# Patient Record
Sex: FEMALE | Race: WHITE | NOT HISPANIC OR LATINO | Employment: FULL TIME | URBAN - METROPOLITAN AREA
[De-identification: names, ages, dates, MRNs, and addresses within clinical notes are randomized per-mention and may not be internally consistent; named-entity substitution may affect disease eponyms.]

---

## 2020-09-16 ENCOUNTER — LAB (OUTPATIENT)
Dept: LAB | Facility: HOSPITAL | Age: 20
End: 2020-09-16

## 2020-09-16 ENCOUNTER — TRANSCRIBE ORDERS (OUTPATIENT)
Dept: LAB | Facility: HOSPITAL | Age: 20
End: 2020-09-16

## 2020-09-16 DIAGNOSIS — R73.01 IMPAIRED FASTING GLUCOSE: Primary | ICD-10-CM

## 2020-09-16 PROCEDURE — 36415 COLL VENOUS BLD VENIPUNCTURE: CPT | Performed by: NURSE PRACTITIONER

## 2020-09-16 PROCEDURE — 83036 HEMOGLOBIN GLYCOSYLATED A1C: CPT | Performed by: NURSE PRACTITIONER

## 2020-09-16 PROCEDURE — 82947 ASSAY GLUCOSE BLOOD QUANT: CPT | Performed by: NURSE PRACTITIONER

## 2020-09-17 LAB
GLUCOSE SERPL-MCNC: 92 MG/DL (ref 65–99)
HBA1C MFR BLD: 5.36 % (ref 4.8–5.6)

## 2021-12-20 ENCOUNTER — OFFICE VISIT (OUTPATIENT)
Dept: NEUROLOGY | Facility: CLINIC | Age: 21
End: 2021-12-20

## 2021-12-20 VITALS
SYSTOLIC BLOOD PRESSURE: 102 MMHG | WEIGHT: 143 LBS | RESPIRATION RATE: 14 BRPM | TEMPERATURE: 98.6 F | BODY MASS INDEX: 24.41 KG/M2 | HEIGHT: 64 IN | OXYGEN SATURATION: 99 % | HEART RATE: 94 BPM | DIASTOLIC BLOOD PRESSURE: 68 MMHG

## 2021-12-20 DIAGNOSIS — G43.709 CHRONIC MIGRAINE WITHOUT AURA WITHOUT STATUS MIGRAINOSUS, NOT INTRACTABLE: Primary | ICD-10-CM

## 2021-12-20 PROCEDURE — 99214 OFFICE O/P EST MOD 30 MIN: CPT | Performed by: NURSE PRACTITIONER

## 2021-12-20 RX ORDER — LEVOCETIRIZINE DIHYDROCHLORIDE 5 MG/1
5 TABLET, FILM COATED ORAL EVERY EVENING
COMMUNITY
End: 2023-03-31

## 2021-12-20 RX ORDER — DROSPIRENONE AND ETHINYL ESTRADIOL 0.03MG-3MG
1 KIT ORAL DAILY
COMMUNITY
End: 2023-03-31

## 2021-12-20 RX ORDER — SUMATRIPTAN 50 MG/1
TABLET, FILM COATED ORAL
Qty: 9 TABLET | Refills: 5 | Status: SHIPPED | OUTPATIENT
Start: 2021-12-20 | End: 2022-01-05

## 2021-12-20 RX ORDER — PROPRANOLOL HYDROCHLORIDE 10 MG/1
10 TABLET ORAL AS NEEDED
COMMUNITY
End: 2023-03-31

## 2021-12-20 RX ORDER — AZELASTINE HYDROCHLORIDE 0.5 MG/ML
1 SOLUTION/ DROPS OPHTHALMIC DAILY
COMMUNITY
End: 2023-03-31

## 2021-12-20 RX ORDER — BUSPIRONE HYDROCHLORIDE 15 MG/1
15 TABLET ORAL 2 TIMES DAILY
COMMUNITY

## 2021-12-20 RX ORDER — VIT C/B6/B5/MAGNESIUM/HERB 173 50-5-6-5MG
1000 CAPSULE ORAL DAILY
COMMUNITY
End: 2023-03-31

## 2021-12-20 NOTE — PROGRESS NOTES
Subjective:     Patient ID: Delmis Lynne is a 21 y.o. female.    CC:   Chief Complaint   Patient presents with   • Establish Care     migraines       HPI:   History of Present Illness     Delmis is a very pleasant 21-year-old female here today for initial neurological evaluation for migraine headaches.  She tells me she first started to notice migraines about a year and a half ago.  When she first began having headache she was experiencing a scotoma and some blurred vision that would lead to headaches.  She first saw her eye doctor who told her that she was likely having migraines and recommended that she follow-up with her primary care.  She does suffer from anxiety and sees a mental health provider a couple times a year, she takes BuSpar and as needed propranolol, she only takes propranolol maybe once per month.  She discussed her headaches with her mental health nurse practitioner who told her she might want to talk to her OB/GYN about this, no one has ever really given her any medications for migraines.  She is never taken any triptan's.  When she gets migraines are typically on the right side of her head described as a pressure, sometimes they wake her up at night, sometimes they occur throughout the day.  She does have associated photo and phonophobia as well as nausea and at times diarrhea with her migraines.  When she has a headache she will drink caffeine and take Excedrin at times, nothing really seems to be effective.  She does not really drink caffeine regularly otherwise.  She drinks a lot of water, she gets adequate sleep.  She is having at least 2 migraines per week, typically they last up to 8 hours, she has to go lie down and sometimes they will resolve.  She has never had any imaging of her brain.  She has had a normal eye exam, she was given glasses but was told everything with her discs and nerves looked good  She is on birth control, no plans for pregnancy in the near future  She denies any  "vision changes, confusional spells, seizures, stroke symptoms or slurred speech.  She denies paresthesias, syncope, tremor, weakness or falls.  No significant family history of migraines, denies history of head injury    The following portions of the patient's history were reviewed and updated as appropriate: allergies, current medications, past family history, past medical history, past social history, past surgical history and problem list.    Past Medical History:   Diagnosis Date   • Anxiety    • Environmental allergies        Past Surgical History:   Procedure Laterality Date   • TONSILLECTOMY  2004       Social History     Socioeconomic History   • Marital status:    Tobacco Use   • Smoking status: Never Smoker   • Smokeless tobacco: Never Used   Vaping Use   • Vaping Use: Never used   Substance and Sexual Activity   • Alcohol use: Never       Family History   Problem Relation Age of Onset   • Alcohol abuse Father    • Breast cancer Maternal Grandmother         Review of Systems   Constitutional: Negative.    Eyes: Negative.    Respiratory: Negative.    Cardiovascular: Negative.    Gastrointestinal: Negative.    Genitourinary: Negative.    Musculoskeletal: Negative.    Neurological: Positive for headaches. Negative for dizziness, tremors, seizures, syncope, facial asymmetry, speech difficulty, weakness, light-headedness and numbness.   Psychiatric/Behavioral: The patient is nervous/anxious.         Objective:  /68   Pulse 94   Temp 98.6 °F (37 °C) (Infrared)   Resp 14   Ht 162.6 cm (64\")   Wt 64.9 kg (143 lb)   SpO2 99%   BMI 24.55 kg/m²     Neurologic Exam     Mental Status   Oriented to person, place, and time.   Attention: normal. Concentration: normal.   Speech: speech is normal   Level of consciousness: alert  Knowledge: consistent with education.   Able to read. Able to write. Normal comprehension.     Cranial Nerves   Cranial nerves II through XII intact.     CN II   Visual fields " full to confrontation.   Right visual field deficit: none  Left visual field deficit: none     CN III, IV, VI   Pupils are equal, round, and reactive to light.  Extraocular motions are normal.   Right pupil: Size: 3 mm. Shape: regular. Reactivity: brisk. Consensual response: intact. Accommodation: intact.   Left pupil: Size: 3 mm. Shape: regular. Reactivity: brisk. Consensual response: intact. Accommodation: intact.   CN III: no CN III palsy  CN VI: no CN VI palsy  Nystagmus: none   Upgaze: normal  Downgaze: normal    CN V   Facial sensation intact.     CN VII   Facial expression full, symmetric.     CN VIII   CN VIII normal.     CN IX, X   CN IX normal.   CN X normal.     CN XI   CN XI normal.     CN XII   CN XII normal.     Motor Exam   Muscle bulk: normal  Overall muscle tone: normal  Right arm tone: normal  Left arm tone: normal  Right arm pronator drift: absent  Left arm pronator drift: absent  Right leg tone: normal  Left leg tone: normal    Strength   Strength 5/5 throughout.     Sensory Exam   Light touch normal.     Gait, Coordination, and Reflexes     Gait  Gait: normal    Coordination   Romberg: negative  Finger to nose coordination: normal  Heel to shin coordination: normal  Tandem walking coordination: normal    Tremor   Resting tremor: absent  Intention tremor: absent  Action tremor: absent    Reflexes   Reflexes 2+ except as noted.   Right plantar: normal  Left plantar: normal  Right Mann: absent  Left Mann: absent      Physical Exam  Constitutional:       General: She is not in acute distress.     Appearance: Normal appearance. She is not ill-appearing or toxic-appearing.   HENT:      Mouth/Throat:      Mouth: Mucous membranes are moist.   Eyes:      Extraocular Movements: Extraocular movements intact and EOM normal.      Pupils: Pupils are equal, round, and reactive to light.   Pulmonary:      Effort: Pulmonary effort is normal.   Neurological:      General: No focal deficit present.       Mental Status: She is alert and oriented to person, place, and time.      Coordination: Finger-Nose-Finger Test, Heel to Shin Test and Romberg Test normal.      Gait: Gait is intact. Tandem walk normal.      Deep Tendon Reflexes: Strength normal.   Psychiatric:         Mood and Affect: Mood normal.         Speech: Speech normal.         Assessment/Plan:       Diagnoses and all orders for this visit:    1. Chronic migraine without aura without status migrainosus, not intractable (Primary)  -     MRI Brain Without Contrast; Future    Other orders  -     SUMAtriptan (IMITREX) 50 MG tablet; Take 1/2 to 1 tablet at onset of headache. May repeat dose one time in 2 hours if headache not relieved.  Dispense: 9 tablet; Refill: 5    This patient has a history of migraines for about a year and a half now.  She has never had any brain imaging, will check MRI of the brain without contrast rule out tumor or lesion.  She is not really interested in a daily preventative medication at this time.  She does currently take propranolol as needed 10 mg, she uses this rarely.  She has a blood pressure today 102/68 so will be hesitant for her to use this regularly.  We did discuss possibly trying amitriptyline should she decide she would like to use something for prevention.  I have also given her a handout on supplements including magnesium, co-Q10 and riboflavin that she can obtain over-the-counter.  Recommend that she drink plenty of fluids, limit caffeine and NSAIDs.  She sleeps well, sleep apnea unlikely given body habitus but could be a consideration if headaches continue in spite of treatment  She is up-to-date on eye exam  I have given her prescription for sumatriptan to use as needed, she is instructed on proper use and possible side effects.  I would like to see her back here at next available clinic appointment, she is encouraged to reach out to us with any questions concerns or problems prior to follow-up appointment            Reviewed medications, potential side effects and signs and symptoms to report. Discussed risk versus benefits of treatment plan with patient and/or family-including medications, labs and radiology that may be ordered. Addressed questions and concerns during visit. Patient and/or family verbalized understanding and agree with plan.    AS THE PROVIDER, I PERSONALLY WORE PPE DURING ENTIRE FACE TO FACE ENCOUNTER IN CLINIC WITH THE PATIENT. PATIENT ALSO WORE PPE DURING ENTIRE FACE TO FACE ENCOUNTER EXCEPT FOR A MAX OF 30 SECONDS DURING NEUROLOGICAL EVALUATION OF CRANIAL NERVES AND THEN MASK WAS PLACED BACK OVER PATIENT FACE FOR REMAINDER OF VISIT. I WASHED MY HANDS BEFORE AND AFTER VISIT.    During this visit the following were done:  Labs Reviewed []    Labs Ordered []    Radiology Reports Reviewed []    Radiology Ordered []    PCP Records Reviewed []    Referring Provider Records Reviewed []    ER Records Reviewed []    Hospital Records Reviewed []    History Obtained From Family []    Radiology Images Reviewed []    Other Reviewed []    Records Requested []      Marcelina Humphries, APRN  12/20/2021

## 2022-01-04 ENCOUNTER — TELEPHONE (OUTPATIENT)
Dept: NEUROLOGY | Facility: CLINIC | Age: 22
End: 2022-01-04

## 2022-01-04 DIAGNOSIS — G43.709 CHRONIC MIGRAINE WITHOUT AURA WITHOUT STATUS MIGRAINOSUS, NOT INTRACTABLE: Primary | ICD-10-CM

## 2022-01-04 NOTE — TELEPHONE ENCOUNTER
Caller: Delmis Lynne    Relationship: Self    Best call back number: [phone]    What medications are you currently taking:   Current Outpatient Medications on File Prior to Visit   Medication Sig Dispense Refill   • azelastine (OPTIVAR) 0.05 % ophthalmic solution 1 drop Daily.     • busPIRone (BUSPAR) 15 MG tablet Take 15 mg by mouth 2 (Two) Times a Day.     • drospirenone-ethinyl estradiol (Ocella) 3-0.03 MG per tablet Take 1 tablet by mouth Daily.     • levocetirizine (XYZAL) 5 MG tablet Take 5 mg by mouth Every Evening.     • Probiotic Product (PROBIOTIC-10 PO) Take  by mouth Daily.     • propranolol (INDERAL) 10 MG tablet Take 10 mg by mouth As Needed.     • SUMAtriptan (IMITREX) 50 MG tablet Take 1/2 to 1 tablet at onset of headache. May repeat dose one time in 2 hours if headache not relieved. 9 tablet 5   • Turmeric 500 MG capsule Take 1,000 mg by mouth Daily.     • Zinc Oxide-Vitamin C (ZINC PLUS VITAMIN C PO) Take 1 tablet by mouth Daily.       No current facility-administered medications on file prior to visit.      When did you start taking these medications: 12-31-21    Which medication are you concerned about: SUMAtriptan (IMITREX) 50 MG tablet    Who prescribed you this medication:   SACHI    What are your concerns: PT HAS TAKEN THE RX TWICE. THE FIRST TIME SHE TOOK IT, SHE SAYS IT DIDN'T WORK.    THE SECOND TIME SHE TOOK IT ON 1-1-22 THE PAIN LEFT FOR ABOUT 2 HOURS AND THEN THE HEADACHE RETURNED.    PT IS WANTING TO KNOW IF SHE CAN GET A STRONGER DOSAGE.    PT'S PHARM IS [facility] AS LISTED.

## 2022-01-05 ENCOUNTER — APPOINTMENT (OUTPATIENT)
Dept: MRI IMAGING | Facility: HOSPITAL | Age: 22
End: 2022-01-05

## 2022-01-05 RX ORDER — SUMATRIPTAN 100 MG/1
TABLET, FILM COATED ORAL
Qty: 9 TABLET | Refills: 5 | Status: SHIPPED | OUTPATIENT
Start: 2022-01-05 | End: 2022-03-11

## 2022-01-05 NOTE — TELEPHONE ENCOUNTER
Patient informed of message and she actually did get a notice from her Pharmacy and she has picked up the new RX.

## 2022-01-05 NOTE — TELEPHONE ENCOUNTER
Yes, we can increase the sumatriptan to 100mg. She can take 1 pill at onset of migraine and she can repeat taking 1 pill 2 hours later if migraine occurs. Will send new script to pharmacy. ThanksSunshine

## 2022-01-06 ENCOUNTER — APPOINTMENT (OUTPATIENT)
Dept: MRI IMAGING | Facility: HOSPITAL | Age: 22
End: 2022-01-06

## 2022-03-11 ENCOUNTER — OFFICE VISIT (OUTPATIENT)
Dept: NEUROLOGY | Facility: CLINIC | Age: 22
End: 2022-03-11

## 2022-03-11 VITALS
TEMPERATURE: 97.2 F | BODY MASS INDEX: 24.41 KG/M2 | DIASTOLIC BLOOD PRESSURE: 70 MMHG | SYSTOLIC BLOOD PRESSURE: 120 MMHG | HEART RATE: 93 BPM | OXYGEN SATURATION: 97 % | WEIGHT: 143 LBS | HEIGHT: 64 IN

## 2022-03-11 DIAGNOSIS — G43.709 CHRONIC MIGRAINE WITHOUT AURA WITHOUT STATUS MIGRAINOSUS, NOT INTRACTABLE: Primary | ICD-10-CM

## 2022-03-11 PROCEDURE — 99213 OFFICE O/P EST LOW 20 MIN: CPT | Performed by: NURSE PRACTITIONER

## 2022-03-11 RX ORDER — RIZATRIPTAN BENZOATE 10 MG/1
10 TABLET, ORALLY DISINTEGRATING ORAL ONCE AS NEEDED
Qty: 9 TABLET | Refills: 5 | Status: SHIPPED | OUTPATIENT
Start: 2022-03-11 | End: 2023-03-31

## 2022-03-11 RX ORDER — AZELASTINE 1 MG/ML
SPRAY, METERED NASAL
COMMUNITY
End: 2023-03-31

## 2022-03-11 RX ORDER — ONDANSETRON 4 MG/1
TABLET, FILM COATED ORAL
COMMUNITY

## 2022-03-11 NOTE — PROGRESS NOTES
Subjective:     Patient ID: Delmis Lynne is a 21 y.o. female.    CC:   Chief Complaint   Patient presents with   • Migraine       HPI:   History of Present Illness     Delmis is here today for follow up.  I first saw her back in December 2021 to establish care for migraine headaches.      She told me she first started to notice migraines about a year and a half prior.  When she first began having headache she was experiencing a scotoma and some blurred vision that would lead to headaches.  She first saw her eye doctor who told her that she was likely having migraines and recommended that she follow-up with her primary care.  She does suffer from anxiety and sees a mental health provider a couple times a year, she takes BuSpar and as needed propranolol, she only takes propranolol maybe once per month.  She discussed her headaches with her mental health nurse practitioner who told her she might want to talk to her OB/GYN about this, no one has ever really given her any medications for migraines.      She had never taken any preventative or acute migraine medications.  When said that when she gets migraines they are typically on the right side of her head described as a pressure, sometimes they wake her up at night, sometimes they occur throughout the day.  She does have associated photo and phonophobia as well as nausea and at times diarrhea with her migraines.  When she has a headache she will drink caffeine and take Excedrin at times, nothing really seems to be effective.  She does not really drink caffeine regularly otherwise.  She drinks a lot of water, she gets adequate sleep.  She was having at least 2 migraines per week, typically they last up to 8 hours, she has to go lie down and sometimes they will resolve.  She had never had any imaging of her brain.  She has had a normal eye exam, she was given glasses but was told everything with her discs and nerves looked good  She is on birth control, no plans for  pregnancy in the near future  She denied any vision changes, confusional spells, seizures, stroke symptoms or slurred speech.  She denied paresthesias, syncope, tremor, weakness or falls.  No significant family history of migraines, denies history of head injury    After last visit we did discuss medication options, she was not interested in a daily medication at that time, I gave her a prescription for sumatriptan, she does not really feel like it is effective, she had increased from 50 mg as needed 200 mg, she never took a second dose.  She does feel like her headaches are a little less often maybe 1/week.  She has no new headache features.  I did order an MRI of her brain when she was here last, unfortunately due to some family issues and having COVID she has not yet had the scan.  At this time financially she does not feel like it is a good time so she does not want this reordered at this time.  She has no new complaints  The following portions of the patient's history were reviewed and updated as appropriate: allergies, current medications, past family history, past medical history, past social history, past surgical history and problem list.    Past Medical History:   Diagnosis Date   • Anxiety    • Environmental allergies        Past Surgical History:   Procedure Laterality Date   • TONSILLECTOMY  2004       Social History     Socioeconomic History   • Marital status:    Tobacco Use   • Smoking status: Never Smoker   • Smokeless tobacco: Never Used   Vaping Use   • Vaping Use: Never used   Substance and Sexual Activity   • Alcohol use: Never   • Drug use: Never   • Sexual activity: Yes     Partners: Male     Birth control/protection: OCP       Family History   Problem Relation Age of Onset   • Alcohol abuse Father    • Breast cancer Maternal Grandmother         Review of Systems   Constitutional: Negative.    Eyes: Negative.    Respiratory: Negative.    Cardiovascular: Negative.    Gastrointestinal:  "Negative.    Neurological: Positive for headaches. Negative for dizziness, tremors, seizures, syncope, facial asymmetry, speech difficulty, weakness, light-headedness and numbness.        Objective:  /70   Pulse 93   Temp 97.2 °F (36.2 °C)   Ht 162.6 cm (64\")   Wt 64.9 kg (143 lb)   SpO2 97%   BMI 24.55 kg/m²     Neurologic Exam     Mental Status   Oriented to person, place, and time.   Attention: normal. Concentration: normal.   Speech: speech is normal   Level of consciousness: alert  Knowledge: consistent with education.   Able to read. Able to write. Normal comprehension.     Cranial Nerves   Cranial nerves II through XII intact.     CN III, IV, VI   Pupils are equal, round, and reactive to light.  Extraocular motions are normal.   Right pupil: Shape: regular. Reactivity: brisk. Consensual response: intact. Accommodation: intact.   Left pupil: Shape: regular. Reactivity: brisk. Consensual response: intact. Accommodation: intact.   CN III: no CN III palsy  CN VI: no CN VI palsy  Nystagmus: none   Upgaze: normal  Downgaze: normal    CN V   Facial sensation intact.     CN VII   Facial expression full, symmetric.     CN VIII   CN VIII normal.     CN IX, X   CN IX normal.   CN X normal.     CN XI   CN XI normal.     CN XII   CN XII normal.     Motor Exam   Muscle bulk: normal  Overall muscle tone: normal  Right arm pronator drift: absent  Left arm pronator drift: absent    Strength   Strength 5/5 throughout.     Sensory Exam   Light touch normal.     Gait, Coordination, and Reflexes     Gait  Gait: normal    Coordination   Finger to nose coordination: normal    Tremor   Resting tremor: absent  Intention tremor: absent  Action tremor: absent    Reflexes   Reflexes 2+ except as noted.       Physical Exam  Vitals reviewed.   Constitutional:       General: She is not in acute distress.     Appearance: She is well-developed. She is not ill-appearing or toxic-appearing.   HENT:      Head: Normocephalic and " atraumatic.      Mouth/Throat:      Mouth: Mucous membranes are moist.   Eyes:      General: No scleral icterus.     Extraocular Movements: Extraocular movements intact and EOM normal.      Conjunctiva/sclera: Conjunctivae normal.      Pupils: Pupils are equal, round, and reactive to light.   Pulmonary:      Effort: Pulmonary effort is normal. No respiratory distress.   Musculoskeletal:      Cervical back: Normal range of motion and neck supple.   Skin:     General: Skin is warm.      Capillary Refill: Capillary refill takes less than 2 seconds.   Neurological:      General: No focal deficit present.      Mental Status: She is alert and oriented to person, place, and time.      Coordination: Finger-Nose-Finger Test normal.      Gait: Gait is intact.      Deep Tendon Reflexes: Strength normal.   Psychiatric:         Mood and Affect: Mood normal.         Speech: Speech normal.         Behavior: Behavior normal.         Thought Content: Thought content normal.         Judgment: Judgment normal.         Assessment/Plan:       Diagnoses and all orders for this visit:    1. Chronic migraine without aura without status migrainosus, not intractable (Primary)  -     rizatriptan MLT (Maxalt-MLT) 10 MG disintegrating tablet; Place 1 tablet on the tongue 1 (One) Time As Needed for Migraine for up to 1 dose. May repeat in 2 hours if needed  Dispense: 9 tablet; Refill: 5    Patient again denies daily oral headache prevention medication  We will switch her Imitrex to Maxalt to use as needed.  Again recommend supplements including magnesium, co-Q10 and riboflavin, handout was given last visit.    She will contact me when she is ready for me to reorder MRI.  At this time we will see her back in about 3 to 4 months but I encouraged her to reach out with any problems or concerns prior to follow-up appointment  Limit NSAIDs and caffeine use, push fluids and maintain regular sleep schedule         Reviewed medications, potential side  effects and signs and symptoms to report. Discussed risk versus benefits of treatment plan with patient and/or family-including medications, labs and radiology that may be ordered. Addressed questions and concerns during visit. Patient and/or family verbalized understanding and agree with plan.    AS THE PROVIDER, I PERSONALLY WORE PPE DURING ENTIRE FACE TO FACE ENCOUNTER IN CLINIC WITH THE PATIENT. PATIENT ALSO WORE PPE DURING ENTIRE FACE TO FACE ENCOUNTER EXCEPT FOR A MAX OF 30 SECONDS DURING NEUROLOGICAL EVALUATION OF CRANIAL NERVES AND THEN MASK WAS PLACED BACK OVER PATIENT FACE FOR REMAINDER OF VISIT. I WASHED MY HANDS BEFORE AND AFTER VISIT.    During this visit the following were done:  Labs Reviewed []    Labs Ordered []    Radiology Reports Reviewed []    Radiology Ordered []    PCP Records Reviewed []    Referring Provider Records Reviewed []    ER Records Reviewed []    Hospital Records Reviewed []    History Obtained From Family []    Radiology Images Reviewed []    Other Reviewed []    Records Requested []      Marcelina Humphries, MYKE  3/11/2022

## 2022-07-11 ENCOUNTER — OFFICE VISIT (OUTPATIENT)
Dept: NEUROLOGY | Facility: CLINIC | Age: 22
End: 2022-07-11

## 2022-07-11 VITALS
TEMPERATURE: 97.9 F | HEART RATE: 96 BPM | SYSTOLIC BLOOD PRESSURE: 110 MMHG | BODY MASS INDEX: 23.56 KG/M2 | WEIGHT: 138 LBS | HEIGHT: 64 IN | DIASTOLIC BLOOD PRESSURE: 70 MMHG | OXYGEN SATURATION: 99 %

## 2022-07-11 DIAGNOSIS — Z3A.08 8 WEEKS GESTATION OF PREGNANCY: ICD-10-CM

## 2022-07-11 DIAGNOSIS — G43.909 EPISODIC MIGRAINE: Primary | ICD-10-CM

## 2022-07-11 PROCEDURE — 99213 OFFICE O/P EST LOW 20 MIN: CPT | Performed by: NURSE PRACTITIONER

## 2022-07-11 NOTE — PROGRESS NOTES
Subjective:     Patient ID: Delmis Lynne is a 22 y.o. female.    CC:   Chief Complaint   Patient presents with   • Migraine       HPI:   History of Present Illness     Delmis is here today for follow up.  I first saw her back in December 2021 to establish care for migraine headaches.       She told me she first started to notice migraines about a year and a half prior.  When she first began having headache she was experiencing a scotoma and some blurred vision that would lead to headaches.  She first saw her eye doctor who told her that she was likely having migraines and recommended that she follow-up with her primary care.  She does suffer from anxiety and sees a mental health provider a couple times a year, she takes BuSpar and as needed propranolol, she only takes propranolol maybe once per month.  She discussed her headaches with her mental health nurse practitioner who told her she might want to talk to her OB/GYN about this, no one has ever really given her any medications for migraines.       She had never taken any preventative or acute migraine medications.  When said that when she gets migraines they are typically on the right side of her head described as a pressure, sometimes they wake her up at night, sometimes they occur throughout the day.  She does have associated photo and phonophobia as well as nausea and at times diarrhea with her migraines.  When she has a headache she will drink caffeine and take Excedrin at times, nothing really seems to be effective.  She does not really drink caffeine regularly otherwise.  She drinks a lot of water, she gets adequate sleep.  She was having at least 2 migraines per week, typically they last up to 8 hours, she has to go lie down and sometimes they will resolve.  She had never had any imaging of her brain.  She has had a normal eye exam, she was given glasses but was told everything with her discs and nerves looked good  She is on birth control, no plans  for pregnancy in the near future  She denied any vision changes, confusional spells, seizures, stroke symptoms or slurred speech.  She denied paresthesias, syncope, tremor, weakness or falls.  No significant family history of migraines, denies history of head injury     she was not interested in a daily medications, I gave her a prescription for sumatriptan, she does not really feel like it is effective, she had increased from 50 mg as needed to 100 mg, she never took a second dose.  She did tell me last visit she felt like her headaches are a little less often maybe 1/week.  She was having no new headache features.  I did order an MRI of her brain when she was here last, unfortunately due to some family issues and having COVID she has not yet had the scan, she was not interested in rescheduling this at last visit    Today she is here for follow-up, she says that overall her migraines have been doing really well, she was having migraines less often, she did not feel the Maxalt.  She was taking magnesium as needed which seemed to help.  She is currently 8 weeks pregnant, she has not establish care with OB yet, she sees them tomorrow.    She denies any new concerns  The following portions of the patient's history were reviewed and updated as appropriate: allergies, current medications, past family history, past medical history, past social history, past surgical history and problem list.    Past Medical History:   Diagnosis Date   • Anxiety    • Environmental allergies        Past Surgical History:   Procedure Laterality Date   • TONSILLECTOMY  2004       Social History     Socioeconomic History   • Marital status:    Tobacco Use   • Smoking status: Never Smoker   • Smokeless tobacco: Never Used   Vaping Use   • Vaping Use: Never used   Substance and Sexual Activity   • Alcohol use: Never   • Drug use: Never   • Sexual activity: Yes     Partners: Male     Birth control/protection: OCP       Family History  "  Problem Relation Age of Onset   • Alcohol abuse Father    • Breast cancer Maternal Grandmother         Review of Systems   Constitutional: Negative.    Eyes: Negative.    Respiratory: Negative.    Cardiovascular: Negative.    Gastrointestinal: Negative.    Endocrine: Negative.    Genitourinary: Negative.    Musculoskeletal: Negative.    Skin: Negative.    Allergic/Immunologic: Negative.    Neurological: Positive for headaches. Negative for dizziness, tremors, seizures, syncope, facial asymmetry, speech difficulty, weakness, light-headedness and numbness.   Hematological: Negative.    Psychiatric/Behavioral: Negative.         Objective:  /70   Pulse 96   Temp 97.9 °F (36.6 °C)   Ht 162.6 cm (64\")   Wt 62.6 kg (138 lb)   SpO2 99%   BMI 23.69 kg/m²     Neurologic Exam     Mental Status   Oriented to person, place, and time.   Attention: normal. Concentration: normal.   Speech: speech is normal   Level of consciousness: alert  Knowledge: consistent with education.   Able to read. Able to write. Normal comprehension.     Cranial Nerves   Cranial nerves II through XII intact.     CN II   Visual fields full to confrontation.   Right visual field deficit: none  Left visual field deficit: none     CN III, IV, VI   Pupils are equal, round, and reactive to light.  Extraocular motions are normal.   Right pupil: Shape: regular. Reactivity: brisk. Consensual response: intact. Accommodation: intact.   Left pupil: Shape: regular. Reactivity: brisk. Consensual response: intact. Accommodation: intact.   CN III: no CN III palsy  CN VI: no CN VI palsy  Nystagmus: none   Upgaze: normal  Downgaze: normal    CN V   Facial sensation intact.     CN VII   Facial expression full, symmetric.     CN VIII   CN VIII normal.     CN IX, X   CN IX normal.   CN X normal.     CN XI   CN XI normal.     CN XII   CN XII normal.     Motor Exam   Muscle bulk: normal  Overall muscle tone: normal  Right arm tone: normal  Left arm tone: " normal  Right arm pronator drift: absent  Left arm pronator drift: absent  Right leg tone: normal  Left leg tone: normal    Strength   Strength 5/5 throughout.     Sensory Exam   Light touch normal.     Gait, Coordination, and Reflexes     Gait  Gait: normal    Coordination   Finger to nose coordination: normal    Tremor   Resting tremor: absent  Intention tremor: absent  Action tremor: absent    Reflexes   Reflexes 2+ except as noted.       Physical Exam  Vitals reviewed.   Constitutional:       General: She is not in acute distress.     Appearance: She is well-developed. She is not ill-appearing or toxic-appearing.   HENT:      Head: Normocephalic and atraumatic.      Mouth/Throat:      Mouth: Mucous membranes are moist.   Eyes:      General: No scleral icterus.     Extraocular Movements: Extraocular movements intact and EOM normal.      Conjunctiva/sclera: Conjunctivae normal.      Pupils: Pupils are equal, round, and reactive to light.   Pulmonary:      Effort: Pulmonary effort is normal. No respiratory distress.   Musculoskeletal:      Cervical back: Normal range of motion and neck supple.   Skin:     General: Skin is warm.      Capillary Refill: Capillary refill takes less than 2 seconds.   Neurological:      General: No focal deficit present.      Mental Status: She is alert and oriented to person, place, and time.      Coordination: Finger-Nose-Finger Test normal.      Gait: Gait is intact.      Deep Tendon Reflexes: Strength normal.   Psychiatric:         Mood and Affect: Mood normal.         Speech: Speech normal.         Behavior: Behavior normal.         Thought Content: Thought content normal.         Judgment: Judgment normal.         Assessment/Plan:       Diagnoses and all orders for this visit:    1. Episodic migraine (Primary)    2. 8 weeks gestation of pregnancy    Patient is here today for follow-up, she reports being around 8 weeks pregnant, has not establish care with OB, sees them tomorrow.   She is taking magnesium as needed, she has not been on any preventative medications for headaches.  Overall headaches have been stable, she recently had a 2 to 3-day headache but it has resolved.  We did discuss that ultimately medication use during pregnancy is at the discretion of OB, if they are okay with her taking sumatriptan she can let me know and I can send this in for her.  I recommend that she hydrate well.  We will see her back in 4 to 6 months or sooner if needed           Reviewed medications, potential side effects and signs and symptoms to report. Discussed risk versus benefits of treatment plan with patient and/or family-including medications, labs and radiology that may be ordered. Addressed questions and concerns during visit. Patient and/or family verbalized understanding and agree with plan.    AS THE PROVIDER, I PERSONALLY WORE PPE DURING ENTIRE FACE TO FACE ENCOUNTER IN CLINIC WITH THE PATIENT. PATIENT ALSO WORE PPE DURING ENTIRE FACE TO FACE ENCOUNTER EXCEPT FOR A MAX OF 30 SECONDS DURING NEUROLOGICAL EVALUATION OF CRANIAL NERVES AND THEN MASK WAS PLACED BACK OVER PATIENT FACE FOR REMAINDER OF VISIT. I WASHED MY HANDS BEFORE AND AFTER VISIT.    During this visit the following were done:  Labs Reviewed []    Labs Ordered []    Radiology Reports Reviewed []    Radiology Ordered []    PCP Records Reviewed []    Referring Provider Records Reviewed []    ER Records Reviewed []    Hospital Records Reviewed []    History Obtained From Family []    Radiology Images Reviewed []    Other Reviewed []    Records Requested []      MYKE Montes De Oca  7/11/2022

## 2022-07-12 ENCOUNTER — TRANSCRIBE ORDERS (OUTPATIENT)
Dept: LAB | Facility: HOSPITAL | Age: 22
End: 2022-07-12

## 2022-07-12 ENCOUNTER — LAB (OUTPATIENT)
Dept: LAB | Facility: HOSPITAL | Age: 22
End: 2022-07-12

## 2022-07-12 DIAGNOSIS — Z34.81 PRENATAL CARE, SUBSEQUENT PREGNANCY, FIRST TRIMESTER: ICD-10-CM

## 2022-07-12 DIAGNOSIS — Z34.81 PRENATAL CARE, SUBSEQUENT PREGNANCY, FIRST TRIMESTER: Primary | ICD-10-CM

## 2022-07-12 LAB
ABO GROUP BLD: NORMAL
BASOPHILS # BLD AUTO: 0.04 10*3/MM3 (ref 0–0.2)
BASOPHILS NFR BLD AUTO: 0.3 % (ref 0–1.5)
DEPRECATED RDW RBC AUTO: 39.7 FL (ref 37–54)
EOSINOPHIL # BLD AUTO: 0.04 10*3/MM3 (ref 0–0.4)
EOSINOPHIL NFR BLD AUTO: 0.3 % (ref 0.3–6.2)
ERYTHROCYTE [DISTWIDTH] IN BLOOD BY AUTOMATED COUNT: 13 % (ref 12.3–15.4)
HCT VFR BLD AUTO: 41.6 % (ref 34–46.6)
HGB BLD-MCNC: 13.9 G/DL (ref 12–15.9)
IMM GRANULOCYTES # BLD AUTO: 0.06 10*3/MM3 (ref 0–0.05)
IMM GRANULOCYTES NFR BLD AUTO: 0.5 % (ref 0–0.5)
LYMPHOCYTES # BLD AUTO: 2.49 10*3/MM3 (ref 0.7–3.1)
LYMPHOCYTES NFR BLD AUTO: 19.3 % (ref 19.6–45.3)
MCH RBC QN AUTO: 28.9 PG (ref 26.6–33)
MCHC RBC AUTO-ENTMCNC: 33.4 G/DL (ref 31.5–35.7)
MCV RBC AUTO: 86.5 FL (ref 79–97)
MONOCYTES # BLD AUTO: 1.08 10*3/MM3 (ref 0.1–0.9)
MONOCYTES NFR BLD AUTO: 8.4 % (ref 5–12)
NEUTROPHILS NFR BLD AUTO: 71.2 % (ref 42.7–76)
NEUTROPHILS NFR BLD AUTO: 9.22 10*3/MM3 (ref 1.7–7)
NRBC BLD AUTO-RTO: 0 /100 WBC (ref 0–0.2)
PLATELET # BLD AUTO: 273 10*3/MM3 (ref 140–450)
PMV BLD AUTO: 10.2 FL (ref 6–12)
RBC # BLD AUTO: 4.81 10*6/MM3 (ref 3.77–5.28)
RH BLD: POSITIVE
WBC NRBC COR # BLD: 12.93 10*3/MM3 (ref 3.4–10.8)

## 2022-07-12 PROCEDURE — 86901 BLOOD TYPING SEROLOGIC RH(D): CPT

## 2022-07-12 PROCEDURE — 86900 BLOOD TYPING SEROLOGIC ABO: CPT

## 2022-07-12 PROCEDURE — 87491 CHLMYD TRACH DNA AMP PROBE: CPT

## 2022-07-12 PROCEDURE — 36415 COLL VENOUS BLD VENIPUNCTURE: CPT

## 2022-07-12 PROCEDURE — 80306 DRUG TEST PRSMV INSTRMNT: CPT

## 2022-07-12 PROCEDURE — 85025 COMPLETE CBC W/AUTO DIFF WBC: CPT

## 2022-07-12 PROCEDURE — 87591 N.GONORRHOEAE DNA AMP PROB: CPT

## 2022-07-13 LAB
AMPHET+METHAMPHET UR QL: NEGATIVE
AMPHETAMINES UR QL: NEGATIVE
BARBITURATES UR QL SCN: NEGATIVE
BENZODIAZ UR QL SCN: NEGATIVE
BUPRENORPHINE SERPL-MCNC: NEGATIVE NG/ML
CANNABINOIDS SERPL QL: NEGATIVE
COCAINE UR QL: NEGATIVE
METHADONE UR QL SCN: NEGATIVE
OPIATES UR QL: NEGATIVE
OXYCODONE UR QL SCN: NEGATIVE
PCP UR QL SCN: NEGATIVE
PROPOXYPH UR QL: NEGATIVE
TRICYCLICS UR QL SCN: NEGATIVE

## 2022-07-15 LAB
C TRACH RRNA SPEC QL NAA+PROBE: NEGATIVE
N GONORRHOEA RRNA SPEC QL NAA+PROBE: NEGATIVE

## 2022-08-09 ENCOUNTER — LAB (OUTPATIENT)
Dept: LAB | Facility: HOSPITAL | Age: 22
End: 2022-08-09

## 2022-08-09 ENCOUNTER — TRANSCRIBE ORDERS (OUTPATIENT)
Dept: LAB | Facility: HOSPITAL | Age: 22
End: 2022-08-09

## 2022-08-09 DIAGNOSIS — Z34.81 PRENATAL CARE, SUBSEQUENT PREGNANCY, FIRST TRIMESTER: Primary | ICD-10-CM

## 2022-08-09 DIAGNOSIS — Z34.81 PRENATAL CARE, SUBSEQUENT PREGNANCY, FIRST TRIMESTER: ICD-10-CM

## 2022-08-09 PROCEDURE — 86803 HEPATITIS C AB TEST: CPT | Performed by: OBSTETRICS & GYNECOLOGY

## 2022-08-09 PROCEDURE — 86762 RUBELLA ANTIBODY: CPT

## 2022-08-09 PROCEDURE — 87340 HEPATITIS B SURFACE AG IA: CPT

## 2022-08-09 PROCEDURE — G0432 EIA HIV-1/HIV-2 SCREEN: HCPCS | Performed by: OBSTETRICS & GYNECOLOGY

## 2022-08-09 PROCEDURE — 36415 COLL VENOUS BLD VENIPUNCTURE: CPT | Performed by: OBSTETRICS & GYNECOLOGY

## 2022-08-09 PROCEDURE — 86780 TREPONEMA PALLIDUM: CPT

## 2022-08-10 LAB
HBV SURFACE AG SERPL QL IA: NORMAL
HCV AB SER DONR QL: NORMAL
HIV1+2 AB SER QL: NORMAL
RUBV IGG SERPL IA-ACNC: 5.58 INDEX

## 2022-08-11 LAB — TREPONEMA PALLIDUM IGG+IGM AB [PRESENCE] IN SERUM OR PLASMA BY IMMUNOASSAY: NON REACTIVE

## 2023-03-31 ENCOUNTER — OFFICE VISIT (OUTPATIENT)
Dept: NEUROLOGY | Facility: CLINIC | Age: 23
End: 2023-03-31
Payer: COMMERCIAL

## 2023-03-31 VITALS
BODY MASS INDEX: 23.69 KG/M2 | DIASTOLIC BLOOD PRESSURE: 80 MMHG | OXYGEN SATURATION: 97 % | HEART RATE: 81 BPM | HEIGHT: 64 IN | SYSTOLIC BLOOD PRESSURE: 116 MMHG

## 2023-03-31 DIAGNOSIS — G43.009 MIGRAINE WITHOUT AURA AND WITHOUT STATUS MIGRAINOSUS, NOT INTRACTABLE: Primary | ICD-10-CM

## 2023-03-31 PROCEDURE — 99213 OFFICE O/P EST LOW 20 MIN: CPT | Performed by: NURSE PRACTITIONER

## 2023-03-31 RX ORDER — OMEGA-3S/DHA/EPA/FISH OIL/D3 300MG-1000
400 CAPSULE ORAL DAILY
COMMUNITY

## 2023-03-31 RX ORDER — SUMATRIPTAN 100 MG/1
TABLET, FILM COATED ORAL
Qty: 12 TABLET | Refills: 5 | Status: SHIPPED | OUTPATIENT
Start: 2023-03-31

## 2023-03-31 RX ORDER — MULTIVITAMIN WITH IRON
250 TABLET ORAL DAILY
COMMUNITY

## 2023-03-31 NOTE — PROGRESS NOTES
"Subjective:     Patient ID: Delmis Lynne is a 22 y.o. female.    CC:   Chief Complaint   Patient presents with   • Headache       HPI:   History of Present Illness   Today 3/31/2023-    This is a pleasant 22-year-old female who presents for follow-up on migraine headaches, previously under the care of MYKE Tavares. She was last seen in the clinic on 07/11/2022. She has had migraines since around 2020. She initially had scotoma and blurred vision leading to headaches. She did see ophthalmology and they diagnosed her with migraines. She has also been noted to have some anxiety, previously followed with a mental health practitioner, and she has been on buspirone and propranolol as needed in the past.     She was recently pregnant, and delivered at [facility] on 02/14/2023. She is approximately 6 weeks postpartum now. At the time of her last visit, she was about 8 weeks pregnant. Previously, she was having up to 2 migraine days a week. She has previously been prescribed sumatriptan in the past prior to pregnancy. She has also tried Maxalt, which she did find helpful. She has used magnesium daily. She is here for follow-up and reevaluation of headaches following recent pregnancy.    Today, she reports her migraines were not \"too bad\" during her pregnancy. If she had a migraine, she would take magnesium oxide and drink Coca-Cola. She did not get migraines often until yesterday, 03/30/2023. The symptoms that she experiences when she gets migraines are pain and a throbbing sensation behind her eyes, and her vision will become blurred. She usually gets nausea and vomiting but she did not yesterday. She has light sensitivity but not sound. She still has Rizatriptan at home, but she has not taken it because she is breastfeeding. She has tried sumatriptan in the past, but it did not provide any relief. She has taken ibuprofen, Excedrin Migraine, and Tylenol in the past, but they do not help. She takes " magnesium oxide daily. She was taking 400 mg of magnesium oxide, but now she is taking 250 mg. She has never tried riboflavin or CoQ10. She has not had an MRI of her brain. She was sent for an MRI in 2022, but her  got sick and went blind around the same time. His vision is back now. Once they got his vision figured out, she was pregnant, so she did not get the MRI. She denies any neck issues. She denies any numbness, tingling, weakness, or confusion. She denies any slurred speech.     She was prescribed propranolol for anxiety, but she did not take it while she was pregnant. She is getting rest. She believes stress triggers her migraines. Her son had his tongue tie removed on 03/28/2023, and she was worried about him being in pain. She believes that is what triggered her migraine yesterday, 03/30/2023. The weather will give her a headache, but not a migraine. She graduated in 12/2022, and she does not get migraines as often. Prior to graduating school, she was getting migraines once a week.    She gets her vision checked regularly. She was due on 09/2022, but she did not go. She has not noticed any changes in vision. She stopped going because each time she went to the eye doctor, they told her that her vision was good, and she would get glasses that really did not have a prescription in them. She would only have pain in her eyes when she got migraines.     She is on the mini pill for oral contraceptive. She does not need Zofran. She denies any complications during pregnancy.    She is allergic to NEOSPORIN.    The following portions of the patient's history were reviewed and updated as appropriate: allergies, current medications, past family history, past medical history, past social history, past surgical history and problem list.    Past Medical History:   Diagnosis Date   • Anxiety    • Environmental allergies        Past Surgical History:   Procedure Laterality Date   • TONSILLECTOMY  2004       Social  History     Socioeconomic History   • Marital status:    Tobacco Use   • Smoking status: Never   • Smokeless tobacco: Never   Vaping Use   • Vaping Use: Never used   Substance and Sexual Activity   • Alcohol use: Never   • Drug use: Never   • Sexual activity: Yes     Partners: Male     Birth control/protection: OCP       Family History   Problem Relation Age of Onset   • Alcohol abuse Father    • Breast cancer Maternal Grandmother           Current Outpatient Medications:   •  busPIRone (BUSPAR) 15 MG tablet, Take 1 tablet by mouth 2 (Two) Times a Day., Disp: , Rfl:   •  Cholecalciferol 10 MCG (400 UNIT) tablet, Take 1 tablet by mouth Daily., Disp: , Rfl:   •  Magnesium 250 MG tablet, Take 1 tablet by mouth Daily., Disp: , Rfl:   •  NORETHIN ACE-ETH ESTRAD-FE PO, Take 0.35 mg by mouth Daily., Disp: , Rfl:   •  ondansetron (ZOFRAN) 4 MG tablet, ondansetron HCl 4 mg tablet, Disp: , Rfl:   •  Prenatal Vit-DSS-Fe Cbn-FA (PRENATAL AD PO), Take  by mouth Daily., Disp: , Rfl:   •  Riboflavin 100 MG capsule, Take 200 mg by mouth Daily., Disp: 180 each, Rfl: 3  •  SUMAtriptan (IMITREX) 100 MG tablet, Take one tablet at onset of headache. May repeat dose one time in 2 hours if headache not relieved., Disp: 12 tablet, Rfl: 5     Review of Systems   Constitutional: Negative for chills, fatigue, fever and unexpected weight change.   HENT: Negative for ear pain, hearing loss, nosebleeds, rhinorrhea and sore throat.    Eyes: Negative for photophobia, pain, discharge, itching and visual disturbance.   Respiratory: Negative for cough, chest tightness, shortness of breath and wheezing.    Cardiovascular: Negative for chest pain, palpitations and leg swelling.   Gastrointestinal: Negative for abdominal pain, blood in stool, constipation, diarrhea, nausea and vomiting.   Genitourinary: Negative for dysuria, frequency, hematuria and urgency.   Musculoskeletal: Negative for arthralgias, back pain, gait problem, joint swelling,  "myalgias, neck pain and neck stiffness.   Skin: Negative for rash and wound.   Allergic/Immunologic: Negative for environmental allergies and food allergies.   Neurological: Positive for headaches. Negative for dizziness, tremors, seizures, syncope, speech difficulty, weakness, light-headedness and numbness.   Hematological: Negative for adenopathy. Does not bruise/bleed easily.   Psychiatric/Behavioral: Negative for agitation, confusion, decreased concentration, hallucinations, sleep disturbance and suicidal ideas. The patient is not nervous/anxious.         Objective:  /80   Pulse 81   Ht 162.6 cm (64\")   SpO2 97%   BMI 23.69 kg/m²     Neurologic Exam     Mental Status   Oriented to person, place, and time.   Speech: speech is normal   Level of consciousness: alert    Cranial Nerves   Cranial nerves II through XII intact.     Motor Exam   Muscle bulk: normal  Overall muscle tone: normal    Strength   Strength 5/5 throughout.     Gait, Coordination, and Reflexes     Gait  Gait: normal    Coordination   Finger to nose coordination: normal    Tremor   Resting tremor: absent  Intention tremor: absent  Action tremor: absent    Reflexes   Right : 2+  Left : 2+      Physical Exam  Constitutional:       Appearance: Normal appearance.   Neurological:      Mental Status: She is alert and oriented to person, place, and time.      Cranial Nerves: Cranial nerves 2-12 are intact.      Motor: Motor strength is normal.      Coordination: Finger-Nose-Finger Test normal.      Gait: Gait is intact.   Psychiatric:         Mood and Affect: Mood and affect normal.         Speech: Speech normal.         Behavior: Behavior normal.         Thought Content: Thought content normal.         Cognition and Memory: Cognition and memory normal.         Judgment: Judgment normal.         Assessment/Plan:       Diagnoses and all orders for this visit:    1. Migraine without aura and without status migrainosus, not intractable " (Primary)  -     Riboflavin 100 MG capsule; Take 200 mg by mouth Daily.  Dispense: 180 each; Refill: 3  -     SUMAtriptan (IMITREX) 100 MG tablet; Take one tablet at onset of headache. May repeat dose one time in 2 hours if headache not relieved.  Dispense: 12 tablet; Refill: 5         She will follow up next available in clinic. She will continue the magnesium 250 mg, can go up to 500 mg daily if needed. We will add on the riboflavin 200 mg daily for prevention as well. Due to her allergies, she will not add CoQ10 due to risk of cross sensitivity reactions. She will use the sumatriptan sparingly if needed. We discussed risks versus benefits with  and breast feeding and discussed that there is no data on rizatriptan, so she opted not to use this for now while breastfeeding. Also, I have asked her to monitor her migraines and if she begins having 4 or more days of migraine in a month, she will contact us to start low dose propranolol daily for migraine prevention which would be safe with breastfeeding.     We also discussed neuroimaging. She prefers to wait on this for now. She has been doing very well. She gets her eyes checked regularly. She has no red flag symptoms. No family history of migraines. She notices these are generally related to stress and since she finished school, she had fewer migraines.     She will follow up in 6 to 7 months in clinic for reevaluation or sooner if needed. She verbalizes understanding and agrees with the plan moving forward today.    Reviewed medications, potential side effects and signs and symptoms to report. Discussed risk versus benefits of treatment plan with patient and/or family-including medications, labs and radiology that may be ordered. Addressed questions and concerns during visit. Patient and/or family verbalized understanding and agree with plan.    AS THE PROVIDER, I PERSONALLY WORE PPE DURING ENTIRE FACE TO FACE ENCOUNTER IN CLINIC WITH THE PATIENT. PATIENT  ALSO WORE PPE DURING ENTIRE FACE TO FACE ENCOUNTER EXCEPT FOR A MAX OF 30 SECONDS DURING NEUROLOGICAL EVALUATION OF CRANIAL NERVES AND THEN MASK WAS PLACED BACK OVER PATIENT FACE FOR REMAINDER OF VISIT. I WASHED MY HANDS BEFORE AND AFTER VISIT.    During this visit the following were done:  Labs Reviewed []    Labs Ordered []    Radiology Reports Reviewed []    Radiology Ordered []    PCP Records Reviewed []    Referring Provider Records Reviewed []    ER Records Reviewed []    Hospital Records Reviewed [x]    History Obtained From Family []    Radiology Images Reviewed []    Other Reviewed [x] prior neuro notes   Records Requested []      Transcribed from ambient dictation for MYKE Castillo by Migdalia Lopez.  03/31/23   11:57 EDT    Patient or patient representative verbalized consent to the visit recording.  I have personally performed the services described in this document as transcribed by the above individual, and it is both accurate and complete.  MYKE Castillo  3/31/2023  12:40 EDT

## 2023-10-16 ENCOUNTER — OFFICE VISIT (OUTPATIENT)
Dept: NEUROLOGY | Facility: CLINIC | Age: 23
End: 2023-10-16
Payer: COMMERCIAL

## 2023-10-16 VITALS
DIASTOLIC BLOOD PRESSURE: 60 MMHG | HEIGHT: 64 IN | WEIGHT: 141 LBS | SYSTOLIC BLOOD PRESSURE: 100 MMHG | OXYGEN SATURATION: 98 % | HEART RATE: 89 BPM | BODY MASS INDEX: 24.07 KG/M2

## 2023-10-16 DIAGNOSIS — G43.009 MIGRAINE WITHOUT AURA AND WITHOUT STATUS MIGRAINOSUS, NOT INTRACTABLE: Primary | ICD-10-CM

## 2023-10-16 PROCEDURE — 99213 OFFICE O/P EST LOW 20 MIN: CPT | Performed by: NURSE PRACTITIONER

## 2023-10-16 RX ORDER — IBUPROFEN 600 MG/1
600 TABLET ORAL EVERY 8 HOURS PRN
Qty: 90 TABLET | Refills: 1 | Status: SHIPPED | OUTPATIENT
Start: 2023-10-16 | End: 2024-10-15

## 2023-10-16 RX ORDER — SUMATRIPTAN 100 MG/1
TABLET, FILM COATED ORAL
Qty: 12 TABLET | Refills: 11 | Status: SHIPPED | OUTPATIENT
Start: 2023-10-16

## 2023-10-16 RX ORDER — TRAZODONE HYDROCHLORIDE 150 MG/1
150 TABLET ORAL
COMMUNITY
Start: 2023-09-27

## 2023-10-16 NOTE — PROGRESS NOTES
"Subjective:     Patient ID: Delmis Lynne is a 23 y.o. female.    CC:   Chief Complaint   Patient presents with    Migraine       HPI:   History of Present Illness  Today 10/16/2023-    This is a pleasant 23-year-old female who presents for 6-month neurology follow-up on migraines present since 2020. She has been taking magnesium daily for migraine prevention, sumatriptan as needed. At her last visit in the clinic, she had recently had a baby on 02/14/2023 and was breastfeeding. She does follow with psychiatry for anxiety. She is here for follow-up and reevaluation of symptoms.    Today, she reports her headaches are doing \"okay\". She has had 3 to 4 migraines since her last visit. She confirms she has been taking sumatriptan, but it is not helping. She states the last time she had a migraine was approximately 1 month ago, and it made her extremely sick. She took her sumatriptan and laid down, but she still had a migraine. She states 2 hours later she got up, and she took the Tylenol, but it still did not help. She reports that she saw a \"TikTok\" video that showed, if you put your feet in water that is hot enough to tolerate that it will take the migraine away, she tried it and it worked. She confirms that she is still breastfeeding. She states when she takes sumatriptan at onset of her migraine, it does provide relief, but if she waits too long, it does not help. She has taken ibuprofen once, and she thinks it helps. She states when she gets a headache, she will drink caffeine, however, she rarely drinks it. She confirms that she has Zofran for nausea.    She is still breastfeeding. She is no longer taking oral contraceptives due to poor tolerance. She states she takes trazodone 150 mg at night.    Prior Neurological history:  She has had migraines since around 2020. She initially had scotoma and blurred vision leading to headaches. She did see ophthalmology and they diagnosed her with migraines. She has also " been noted to have some anxiety, previously followed with a mental health practitioner, and she has been on buspirone and propranolol as needed in the past.      She had a baby sands at [facility] on 02/14/2023. She had no migraines during pregnancy. Previously, she was having up to 2 migraine days a week. She has previously been prescribed sumatriptan in the past prior to pregnancy. She has also tried Maxalt, which she did find helpful. She has used magnesium daily.      The symptoms that she experiences when she gets migraines are pain and a throbbing sensation behind her eyes, and her vision will become blurred. She usually gets nausea and vomiting but she did not yesterday. She has light sensitivity but not sound. She has taken ibuprofen, Excedrin Migraine, and Tylenol in the past, but they do not help. She takes magnesium oxide daily. She was taking 400 mg of magnesium oxide, but now she is taking 250 mg magnesium glycinate. She has not had an MRI of her brain. She denies any neck issues. She denies any numbness, tingling, weakness, or confusion. She denies any slurred speech. The weather will give her a headache, but not a migraine. She graduated in 12/2022, and she does not get migraines as often. Prior to graduating school, she was getting migraines once a week.     She gets her vision checked regularly.     The following portions of the patient's history were reviewed and updated as appropriate: allergies, current medications, past family history, past medical history, past social history, past surgical history, and problem list.    Past Medical History:   Diagnosis Date    Anxiety     Environmental allergies        Past Surgical History:   Procedure Laterality Date    TONSILLECTOMY  2004       Social History     Socioeconomic History    Marital status:    Tobacco Use    Smoking status: Never    Smokeless tobacco: Never   Vaping Use    Vaping Use: Never used   Substance and Sexual Activity     "Alcohol use: Never    Drug use: Never    Sexual activity: Yes     Partners: Male     Birth control/protection: OCP       Family History   Problem Relation Age of Onset    Alcohol abuse Father     Breast cancer Maternal Grandmother           Current Outpatient Medications:     busPIRone (BUSPAR) 15 MG tablet, Take 1 tablet by mouth 2 (Two) Times a Day., Disp: , Rfl:     Cholecalciferol 10 MCG (400 UNIT) tablet, Take 1 tablet by mouth Daily., Disp: , Rfl:     Magnesium 250 MG tablet, Take 1 tablet by mouth Daily., Disp: , Rfl:     ondansetron (ZOFRAN) 4 MG tablet, ondansetron HCl 4 mg tablet, Disp: , Rfl:     Prenatal Vit-DSS-Fe Cbn-FA (PRENATAL AD PO), Take  by mouth Daily., Disp: , Rfl:     SUMAtriptan (IMITREX) 100 MG tablet, Take one tablet at onset of headache. May repeat dose one time in 2 hours if headache not relieved., Disp: 12 tablet, Rfl: 11    traZODone (DESYREL) 150 MG tablet, Take 1 tablet by mouth every night at bedtime., Disp: , Rfl:     ibuprofen (ADVIL,MOTRIN) 600 MG tablet, Take 1 tablet by mouth Every 8 (Eight) Hours As Needed for Headache., Disp: 90 tablet, Rfl: 1     Review of Systems   Neurological:  Positive for headaches.   Psychiatric/Behavioral:  Positive for sleep disturbance.    All other systems reviewed and are negative.       Objective:  /60   Pulse 89   Ht 162.6 cm (64\")   Wt 64 kg (141 lb)   SpO2 98%   BMI 24.20 kg/m²     Neurologic Exam     Mental Status   Oriented to person, place, and time.   Speech: speech is normal   Level of consciousness: alert    Cranial Nerves   Cranial nerves II through XII intact.     Motor Exam   Muscle bulk: normal  Overall muscle tone: normal    Strength   Strength 5/5 throughout.     Gait, Coordination, and Reflexes     Gait  Gait: normal    Coordination   Finger to nose coordination: normal    Tremor   Resting tremor: absent  Intention tremor: absent  Action tremor: absent    Reflexes   Right : 2+  Left : 2+      Physical " Exam  Constitutional:       Appearance: Normal appearance.   Neurological:      Mental Status: She is alert and oriented to person, place, and time.      Cranial Nerves: Cranial nerves 2-12 are intact.      Motor: Motor strength is normal.     Coordination: Finger-Nose-Finger Test normal.      Gait: Gait is intact.   Psychiatric:         Mood and Affect: Mood and affect normal.         Speech: Speech normal.         Assessment/Plan:       Diagnoses and all orders for this visit:    1. Migraine without aura and without status migrainosus, not intractable (Primary)  -     ibuprofen (ADVIL,MOTRIN) 600 MG tablet; Take 1 tablet by mouth Every 8 (Eight) Hours As Needed for Headache.  Dispense: 90 tablet; Refill: 1  -     SUMAtriptan (IMITREX) 100 MG tablet; Take one tablet at onset of headache. May repeat dose one time in 2 hours if headache not relieved.  Dispense: 12 tablet; Refill: 11         She is doing well overall. She has had about 3 to 4 migraines in the past 6 months. When she does wait for the sumatriptan, it does not work, and she has severe migraines. I have encouraged her to take ibuprofen right at onset of migraine and then if she is not better in 30 minutes, can take the sumatriptan. She can use Tylenol as well but will try to take something immediately to avoid those severe migraines. She is still breastfeeding and plans to continue to do so. She prefers to wait on any preventive medication. She is still on the magnesium and is not taking riboflavin. We are going to follow up in 6 to 7 months for reevaluation of symptoms or sooner if needed. She continues to follow with psychiatry.     She verbalizes understanding and agrees with the plan. She will call us with any questions or concerns.    Reviewed medications, potential side effects and signs and symptoms to report. Discussed risk versus benefits of treatment plan with patient and/or family-including medications, labs and radiology that may be ordered.  Addressed questions and concerns during visit. Patient and/or family verbalized understanding and agree with plan.    Note to patient: The 21st Century Cures Act makes medical notes like these available to patients in the interest of transparency. However, be advised this is a medical document. It is intended as peer to peer communication. It is written in medical language and may contain abbreviations or verbiage that are unfamiliar. It may appear blunt or direct. Medical documents are intended to carry relevant information, facts as evident, and the clinical opinion of the provider.      Transcribed from ambient dictation for MYKE Castillo by Migdalia Lopez.  10/16/23   12:36 EDT    Patient or patient representative verbalized consent to the visit recording.  I have personally performed the services described in this document as transcribed by the above individual, and it is both accurate and complete.  MYKE Castillo  10/16/2023  13:39 EDT

## 2024-05-24 ENCOUNTER — OFFICE VISIT (OUTPATIENT)
Dept: NEUROLOGY | Facility: CLINIC | Age: 24
End: 2024-05-24
Payer: COMMERCIAL

## 2024-05-24 VITALS
WEIGHT: 143 LBS | DIASTOLIC BLOOD PRESSURE: 78 MMHG | SYSTOLIC BLOOD PRESSURE: 118 MMHG | OXYGEN SATURATION: 98 % | BODY MASS INDEX: 24.41 KG/M2 | HEART RATE: 113 BPM | HEIGHT: 64 IN

## 2024-05-24 DIAGNOSIS — G43.009 MIGRAINE WITHOUT AURA AND WITHOUT STATUS MIGRAINOSUS, NOT INTRACTABLE: ICD-10-CM

## 2024-05-24 PROCEDURE — 99213 OFFICE O/P EST LOW 20 MIN: CPT | Performed by: NURSE PRACTITIONER

## 2024-05-24 RX ORDER — QUETIAPINE FUMARATE 25 MG/1
25 TABLET, FILM COATED ORAL DAILY
COMMUNITY
Start: 2024-02-09

## 2024-05-24 RX ORDER — ONDANSETRON 4 MG/1
4 TABLET, FILM COATED ORAL EVERY 8 HOURS PRN
Qty: 20 TABLET | Refills: 2 | Status: SHIPPED | OUTPATIENT
Start: 2024-05-24

## 2024-05-24 RX ORDER — IBUPROFEN 600 MG/1
600 TABLET ORAL EVERY 8 HOURS PRN
Qty: 90 TABLET | Refills: 1 | Status: SHIPPED | OUTPATIENT
Start: 2024-05-24 | End: 2025-05-24

## 2024-05-24 NOTE — LETTER
May 24, 2024     Marilia Torres MD  [address]  [address]  [place] [id number]    Patient: Delmis Lynne   YOB: 2000   Date of Visit: 5/24/2024       Dear Marilia Torres MD    Delmis Lynne was in my office today. Below is a copy of my note.    If you have questions, please do not hesitate to call me. I look forward to following Delmis along with you.         Sincerely,        MYKE Castillo        CC: MYKE Martin    Subjective:     Patient ID: Delmis Lynne is a 24 y.o. female.    CC:   Chief Complaint   Patient presents with   • Migraine       HPI:   History of Present Illness  This is a very pleasant 24-year-old female who presents for 7-month neurology follow-up on episodic migraine headaches present since 2020. She was breastfeeding at her last visit, had a baby in 02/2023. She has been prescribed Imitrex and ibuprofen at onset of migraine as needed. She is here for follow-up and reevaluation of symptoms today.    She experiences migraines during her menstrual cycle, with the most recent episode occurring 3 days ago and the subsequent day. She finds relief with ibuprofen 600mg. She experiences 2 migraine days per month. She continues to breastfeed. Her son is 15 months old now. She has discontinued prenatal vitamins and trazodone, but is taking Seroquel for sleep. She suffers from insomnia and she is scheduled for a sleep study next month. Her hormone levels, conducted by her obstetrician, returned normal results. She denies experiencing fatigue. She took sumatriptan approximately 6 months ago, but discontinued its use due to nausea & vomiting. She has noticed that if she does not take ibuprofen in time, her migraines worsen. She has Zofran at home, but requires a refill. Her migraines typically originate behind her eyes and radiate to the back of her head. She occasionally experiences nausea and vomiting, but has not  vomited the past 2 times. She reports photophobia and phonophobia. Her migraines are characterized by throbbing and sharp pains, lasting 1 to 2 days.    Prior Neurological history:  She has had migraines since around 2020. She initially had scotoma and blurred vision leading to headaches. She did see ophthalmology and they diagnosed her with migraines. She has also been noted to have some anxiety, previously followed with a mental health practitioner, and she has been on buspirone and propranolol as needed in the past.      She had a baby sands at [facility] on 02/14/2023. She had no migraines during pregnancy. Previously, she was having up to 2 migraine days a week. She has previously been prescribed sumatriptan in the past prior to pregnancy. She has also tried Maxalt, which she did find helpful. She has used magnesium daily.      The symptoms that she experiences when she gets migraines are pain and a throbbing sensation behind her eyes, and her vision will become blurred. She usually gets nausea and vomiting but she did not yesterday. She has light sensitivity but not sound. She has taken ibuprofen, Excedrin Migraine, and Tylenol in the past, but they do not help. She takes magnesium oxide daily. She was taking 400 mg of magnesium oxide, but now she is taking 250 mg magnesium glycinate. She has not had an MRI of her brain. She denies any neck issues. She denies any numbness, tingling, weakness, or confusion. She denies any slurred speech. The weather will give her a headache, but not a migraine. She graduated in 12/2022, and she does not get migraines as often. Prior to graduating school, she was getting migraines once a week.Sumatriptan now causes nausea and vomiting and intolerant.     She gets her vision checked regularly.     The following portions of the patient's history were reviewed and updated as appropriate: allergies, current medications, past family history, past medical history, past social  "history, past surgical history, and problem list.    Past Medical History:   Diagnosis Date   • Anxiety    • Environmental allergies        Past Surgical History:   Procedure Laterality Date   • TONSILLECTOMY  2004       Social History     Socioeconomic History   • Marital status:    Tobacco Use   • Smoking status: Never   • Smokeless tobacco: Never   Vaping Use   • Vaping status: Never Used   Substance and Sexual Activity   • Alcohol use: Never   • Drug use: Never   • Sexual activity: Yes     Partners: Male     Birth control/protection: OCP       Family History   Problem Relation Age of Onset   • Alcohol abuse Father    • Breast cancer Maternal Grandmother           Current Outpatient Medications:   •  busPIRone (BUSPAR) 15 MG tablet, Take 1 tablet by mouth 2 (Two) Times a Day., Disp: , Rfl:   •  Cholecalciferol 10 MCG (400 UNIT) tablet, Take 1 tablet by mouth Daily., Disp: , Rfl:   •  ibuprofen (ADVIL,MOTRIN) 600 MG tablet, Take 1 tablet by mouth Every 8 (Eight) Hours As Needed for Headache., Disp: 90 tablet, Rfl: 1  •  Magnesium 250 MG tablet, Take 1 tablet by mouth Daily., Disp: , Rfl:   •  ondansetron (ZOFRAN) 4 MG tablet, Take 1 tablet by mouth Every 8 (Eight) Hours As Needed for Nausea or Vomiting., Disp: 20 tablet, Rfl: 2  •  QUEtiapine (SEROquel) 25 MG tablet, Take 1 tablet by mouth Daily., Disp: , Rfl:      Review of Systems   Neurological:  Positive for headaches.   All other systems reviewed and are negative.       Objective:  /78   Pulse 113   Ht 162.6 cm (64\")   Wt 64.9 kg (143 lb)   SpO2 98%   Breastfeeding Yes   BMI 24.55 kg/m²     Neurologic Exam     Mental Status   Oriented to person, place, and time.   Speech: speech is normal   Level of consciousness: alert    Cranial Nerves   Cranial nerves II through XII intact.     Motor Exam   Muscle bulk: normal  Overall muscle tone: normal    Strength   Strength 5/5 throughout.     Gait, Coordination, and Reflexes     Gait  Gait: " normal    Coordination   Finger to nose coordination: normal    Tremor   Resting tremor: absent  Intention tremor: absent  Action tremor: absent    Reflexes   Reflexes 2+ except as noted.   Right : 2+  Left : 2+      Physical Exam  Constitutional:       Appearance: Normal appearance.   Neurological:      Mental Status: She is alert and oriented to person, place, and time.      Cranial Nerves: Cranial nerves 2-12 are intact.      Motor: Motor strength is normal.     Coordination: Finger-Nose-Finger Test normal.      Gait: Gait is intact.   Psychiatric:         Mood and Affect: Mood and affect normal.         Speech: Speech normal.         Results:  Results  Laboratory Studies  Hormones tests were normal by patient report.    Assessment/Plan:     Diagnoses and all orders for this visit:    1. Migraine without aura and without status migrainosus, not intractable  -     ibuprofen (ADVIL,MOTRIN) 600 MG tablet; Take 1 tablet by mouth Every 8 (Eight) Hours As Needed for Headache.  Dispense: 90 tablet; Refill: 1  -     ondansetron (ZOFRAN) 4 MG tablet; Take 1 tablet by mouth Every 8 (Eight) Hours As Needed for Nausea or Vomiting.  Dispense: 20 tablet; Refill: 2           Assessment & Plan  Episodic migraine headaches.  The patient's use of sumatriptan was discontinued due to associated nausea and vomiting. The efficacy of ibuprofen in managing her migraines has been noted. The possibility of naratriptan was discussed, however, the patient opted to defer this option for the time being. The potential need for naratriptan in the future was discussed as well as studies in relation to safety in breastfeeding. The patient expressed understanding and agreement with the proposed plan.    Follow-up  The patient is scheduled for a follow-up visit in 1 year, or earlier if necessary.    Reviewed medications, potential side effects and signs and symptoms to report. Discussed risk versus benefits of treatment plan with patient  and/or family-including medications, labs and radiology that may be ordered. Addressed questions and concerns during visit. Patient and/or family verbalized understanding and agree with plan.    05/24/24   10:51 EDT    Patient or patient representative verbalized consent for the use of Ambient Listening during the visit with  MYKE Castillo for chart documentation. 5/24/2024  12:12 EDT    Note to patient: The 21st Century Cures Act makes medical notes like these available to patients in the interest of transparency. However, be advised this is a medical document. It is intended as peer to peer communication. It is written in medical language and may contain abbreviations or verbiage that are unfamiliar. It may appear blunt or direct. Medical documents are intended to carry relevant information, facts as evident, and the clinical opinion of the provider.

## 2024-05-24 NOTE — PROGRESS NOTES
Subjective:     Patient ID: Delmis Lynne is a 24 y.o. female.    CC:   Chief Complaint   Patient presents with    Migraine       HPI:   History of Present Illness  This is a very pleasant 24-year-old female who presents for 7-month neurology follow-up on episodic migraine headaches present since 2020. She was breastfeeding at her last visit, had a baby in 02/2023. She has been prescribed Imitrex and ibuprofen at onset of migraine as needed. She is here for follow-up and reevaluation of symptoms today.    She experiences migraines during her menstrual cycle, with the most recent episode occurring 3 days ago and the subsequent day. She finds relief with ibuprofen 600mg. She experiences 2 migraine days per month. She continues to breastfeed. Her son is 15 months old now. She has discontinued prenatal vitamins and trazodone, but is taking Seroquel for sleep. She suffers from insomnia and she is scheduled for a sleep study next month. Her hormone levels, conducted by her obstetrician, returned normal results. She denies experiencing fatigue. She took sumatriptan approximately 6 months ago, but discontinued its use due to nausea & vomiting. She has noticed that if she does not take ibuprofen in time, her migraines worsen. She has Zofran at home, but requires a refill. Her migraines typically originate behind her eyes and radiate to the back of her head. She occasionally experiences nausea and vomiting, but has not vomited the past 2 times. She reports photophobia and phonophobia. Her migraines are characterized by throbbing and sharp pains, lasting 1 to 2 days.    Prior Neurological history:  She has had migraines since around 2020. She initially had scotoma and blurred vision leading to headaches. She did see ophthalmology and they diagnosed her with migraines. She has also been noted to have some anxiety, previously followed with a mental health practitioner, and she has been on buspirone and propranolol as needed  in the past.      She had a baby sands at [facility] on 02/14/2023. She had no migraines during pregnancy. Previously, she was having up to 2 migraine days a week. She has previously been prescribed sumatriptan in the past prior to pregnancy. She has also tried Maxalt, which she did find helpful. She has used magnesium daily.      The symptoms that she experiences when she gets migraines are pain and a throbbing sensation behind her eyes, and her vision will become blurred. She usually gets nausea and vomiting but she did not yesterday. She has light sensitivity but not sound. She has taken ibuprofen, Excedrin Migraine, and Tylenol in the past, but they do not help. She takes magnesium oxide daily. She was taking 400 mg of magnesium oxide, but now she is taking 250 mg magnesium glycinate. She has not had an MRI of her brain. She denies any neck issues. She denies any numbness, tingling, weakness, or confusion. She denies any slurred speech. The weather will give her a headache, but not a migraine. She graduated in 12/2022, and she does not get migraines as often. Prior to graduating school, she was getting migraines once a week.Sumatriptan now causes nausea and vomiting and intolerant.     She gets her vision checked regularly.     The following portions of the patient's history were reviewed and updated as appropriate: allergies, current medications, past family history, past medical history, past social history, past surgical history, and problem list.    Past Medical History:   Diagnosis Date    Anxiety     Environmental allergies        Past Surgical History:   Procedure Laterality Date    TONSILLECTOMY  2004       Social History     Socioeconomic History    Marital status:    Tobacco Use    Smoking status: Never    Smokeless tobacco: Never   Vaping Use    Vaping status: Never Used   Substance and Sexual Activity    Alcohol use: Never    Drug use: Never    Sexual activity: Yes     Partners: Male      "Birth control/protection: OCP       Family History   Problem Relation Age of Onset    Alcohol abuse Father     Breast cancer Maternal Grandmother           Current Outpatient Medications:     busPIRone (BUSPAR) 15 MG tablet, Take 1 tablet by mouth 2 (Two) Times a Day., Disp: , Rfl:     Cholecalciferol 10 MCG (400 UNIT) tablet, Take 1 tablet by mouth Daily., Disp: , Rfl:     ibuprofen (ADVIL,MOTRIN) 600 MG tablet, Take 1 tablet by mouth Every 8 (Eight) Hours As Needed for Headache., Disp: 90 tablet, Rfl: 1    Magnesium 250 MG tablet, Take 1 tablet by mouth Daily., Disp: , Rfl:     ondansetron (ZOFRAN) 4 MG tablet, Take 1 tablet by mouth Every 8 (Eight) Hours As Needed for Nausea or Vomiting., Disp: 20 tablet, Rfl: 2    QUEtiapine (SEROquel) 25 MG tablet, Take 1 tablet by mouth Daily., Disp: , Rfl:      Review of Systems   Neurological:  Positive for headaches.   All other systems reviewed and are negative.       Objective:  /78   Pulse 113   Ht 162.6 cm (64\")   Wt 64.9 kg (143 lb)   SpO2 98%   Breastfeeding Yes   BMI 24.55 kg/m²     Neurologic Exam     Mental Status   Oriented to person, place, and time.   Speech: speech is normal   Level of consciousness: alert    Cranial Nerves   Cranial nerves II through XII intact.     Motor Exam   Muscle bulk: normal  Overall muscle tone: normal    Strength   Strength 5/5 throughout.     Gait, Coordination, and Reflexes     Gait  Gait: normal    Coordination   Finger to nose coordination: normal    Tremor   Resting tremor: absent  Intention tremor: absent  Action tremor: absent    Reflexes   Reflexes 2+ except as noted.   Right : 2+  Left : 2+      Physical Exam  Constitutional:       Appearance: Normal appearance.   Neurological:      Mental Status: She is alert and oriented to person, place, and time.      Cranial Nerves: Cranial nerves 2-12 are intact.      Motor: Motor strength is normal.     Coordination: Finger-Nose-Finger Test normal.      Gait: Gait " is intact.   Psychiatric:         Mood and Affect: Mood and affect normal.         Speech: Speech normal.         Results:  Results  Laboratory Studies  Hormones tests were normal by patient report.    Assessment/Plan:     Diagnoses and all orders for this visit:    1. Migraine without aura and without status migrainosus, not intractable  -     ibuprofen (ADVIL,MOTRIN) 600 MG tablet; Take 1 tablet by mouth Every 8 (Eight) Hours As Needed for Headache.  Dispense: 90 tablet; Refill: 1  -     ondansetron (ZOFRAN) 4 MG tablet; Take 1 tablet by mouth Every 8 (Eight) Hours As Needed for Nausea or Vomiting.  Dispense: 20 tablet; Refill: 2           Assessment & Plan  Episodic migraine headaches.  The patient's use of sumatriptan was discontinued due to associated nausea and vomiting. The efficacy of ibuprofen in managing her migraines has been noted. The possibility of naratriptan was discussed, however, the patient opted to defer this option for the time being. The potential need for naratriptan in the future was discussed as well as studies in relation to safety in breastfeeding. The patient expressed understanding and agreement with the proposed plan.    Follow-up  The patient is scheduled for a follow-up visit in 1 year, or earlier if necessary.    Reviewed medications, potential side effects and signs and symptoms to report. Discussed risk versus benefits of treatment plan with patient and/or family-including medications, labs and radiology that may be ordered. Addressed questions and concerns during visit. Patient and/or family verbalized understanding and agree with plan.    05/24/24   10:51 EDT    Patient or patient representative verbalized consent for the use of Ambient Listening during the visit with  MYKE Castillo for chart documentation. 5/24/2024  12:12 EDT    Note to patient: The 21st Century Cures Act makes medical notes like these available to patients in the interest of transparency. However,  be advised this is a medical document. It is intended as peer to peer communication. It is written in medical language and may contain abbreviations or verbiage that are unfamiliar. It may appear blunt or direct. Medical documents are intended to carry relevant information, facts as evident, and the clinical opinion of the provider.

## 2024-06-18 NOTE — PROGRESS NOTES
Chief Complaint  Insomnia    Subjective     History of Present Illness:  Delmis Lynne is a 24 y.o. female with a past medical history with anxiety.  The patient has had difficulty with insomnia.  She has tried multiple medications and is presently prescribed Seroquel noting she has trouble falling asleep and staying asleep.  Review of self-reported questionnaire notes symptoms including frequent awakening, nonrestorative sleep, nasal allergies, grinding teeth, difficulty falling and staying asleep, and memory loss.  Patient reports symptoms have been ongoing for 1-2 years.  She typically goes to bed at 11:30 PM waking at 7:30 AM on weekdays and weekends.  Patient estimates an average of 3-6 hours of sleep per night and can take over an hour for her to get to sleep.  She does not usually take naps.  Patient denies use of tobacco, alcohol, or illicit/recreational drugs. She has been taking Seroquel which has been helpful. She had tried trazodone but it did not help.  She had a baby and this is when her difficulty with insomnia started. She has tried sleep hygiene and hypnosis but nothing helped. She started exercising and since then her insomnia has improved somewhat. She is followed at [facility] counseling for her panic attacks and anxiety.     Further details are as follows:    Douglass Scale is (out of 24): Total score: 1     Estimated average amount of sleep per night: 3-6 hours  Number of times she wakes up at night: 3-4 times  Difficulty falling back asleep: yes  It usually takes 60 minutes to go to sleep.  She feels sleepy upon waking up: yes  Rotating or night shift work: no    Drowsiness/Sleepiness:  She exhibits the following:  None    Snoring/Breathing:  She exhibits the following:  none    Head Injury:  She exhibits the following:  No    Reflux:  She describes the following:  none    Narcolepsy:  She exhibits the following:  none    RLS/PLMs:  She describes the following:  none    Insomnia:  She  describes the following:  problems initiating sleep at night  frequent awakenings    Parasomnia:  She exhibits the following:  Clenched teeth but improved    Weight:       24  0900   Weight: 66.2 kg (146 lb)      Weight change in the last year:  gain: 0 lbs    The patient's relevant past medical, surgical, family, and social history reviewed and updated in Epic as appropriate.    Review of Systems   Constitutional: Negative.    HENT:  Positive for congestion.    Eyes: Negative.    Respiratory: Negative.     Cardiovascular: Negative.    Gastrointestinal:  Positive for nausea.   Endocrine: Negative.    Genitourinary: Negative.    Musculoskeletal: Negative.    Skin: Negative.    Allergic/Immunologic: Negative.    Neurological:  Positive for headache and confusion.   Hematological: Negative.    Psychiatric/Behavioral:  Positive for agitation, decreased concentration and sleep disturbance. The patient is nervous/anxious.        PMH:    Past Medical History:   Diagnosis Date    Anxiety     Environmental allergies      Past Surgical History:   Procedure Laterality Date    TONSILLECTOMY       OB History          1    Para        Term                AB        Living             SAB        IAB        Ectopic        Molar        Multiple        Live Births                  Allergies   Allergen Reactions    Neomycin-Polymyxin-Gramicidin Hives    Gene-Bacit-Poly-Lidocaine Hives       MEDS:  Prior to Admission medications    Medication Sig Start Date End Date Taking? Authorizing Provider   busPIRone (BUSPAR) 15 MG tablet Take 1 tablet by mouth 2 (Two) Times a Day.    Provider, MD Renuka   Cholecalciferol 10 MCG (400 UNIT) tablet Take 1 tablet by mouth Daily.    Provider, MD Renuka   ibuprofen (ADVIL,MOTRIN) 600 MG tablet Take 1 tablet by mouth Every 8 (Eight) Hours As Needed for Headache. 24  Sunshine Villalobos APRN   Magnesium 250 MG tablet Take 1 tablet by mouth Daily.     "ProviderRenuka MD   ondansetron (ZOFRAN) 4 MG tablet Take 1 tablet by mouth Every 8 (Eight) Hours As Needed for Nausea or Vomiting. 5/24/24   Sunshine Villalobos APRN   QUEtiapine (SEROquel) 25 MG tablet Take 1 tablet by mouth Daily. 2/9/24   Renuka Martin MD       FH:  Family History   Problem Relation Age of Onset    Alcohol abuse Father     Breast cancer Maternal Grandmother        Objective   Vital Signs:  /80   Pulse 73   Temp 97.1 °F (36.2 °C) (Infrared)   Ht 162.6 cm (64.02\")   Wt 66.2 kg (146 lb)   SpO2 99%   BMI 25.05 kg/m²     BMI is within normal parameters. No other follow-up for BMI required.        Physical Exam  Vitals reviewed.   Constitutional:       Appearance: Normal appearance.   HENT:      Head: Normocephalic and atraumatic.      Nose: Nose normal.      Mouth/Throat:      Mouth: Mucous membranes are moist.   Cardiovascular:      Rate and Rhythm: Normal rate and regular rhythm.      Heart sounds: No murmur heard.     No friction rub. No gallop.   Pulmonary:      Effort: Pulmonary effort is normal. No respiratory distress.      Breath sounds: Normal breath sounds. No wheezing or rhonchi.   Neurological:      Mental Status: She is alert and oriented to person, place, and time.   Psychiatric:         Behavior: Behavior normal.             Result Review :              Assessment and Plan  Delmis Lynne is a very pleasant 24 y.o. female with a past medical history of anxiety, panic attacks, and depression who presents for further evaluation of insomnia, and excessive daytime sleepiness and fatigue.  Patient's insomnia appears to be multifactorial in nature.  She relates a story of insomnia which started after the birth of her child.  We had a long discussion about her past history and she noted that she has had difficulty with anxiety and panic attacks since she was on a mission trip for her [employer].  Following this trip the patient had difficulty with and related past " issues with S[employer]ism scrupulosity, as well as frequent rumination on death with concerns that her family and significant others will die. I have asked the patient to discuss this further with her counselor.    The patient has a history of anxiety, and postpartum depression which can certainly contribute to insomnia.  Moreover, the patient is a relatively new mother, and this can also contribute to insomnia despite the child being able to sleep through the night.  She has found that Seroquel, as well as exercise are beneficial and her insomnia has improved since she has started.  I am hopeful that this will continue.  I have discussed cognitive behavioral therapy for insomnia.  I have given the patient information on the [facility] online program and asked her to participate in this.  I have asked the patient return for follow-up in approximately 3-4 months to recheck her insomnia.       Diagnoses and all orders for this visit:    1. Psychophysiological insomnia (Primary)    2. Overweight with body mass index (BMI) 25.0-29.9                 I discussed the consequences of uncontrolled sleep apnea including hypertension, heart disease, diabetes, stroke, and dementia. I further discussed sleep apnea therapeutic options including CPAP, Weight loss, Oral dental appliance, and surgery.         Follow Up  Return in about 4 months (around 10/21/2024) for Recheck.  Patient was given instructions and counseling regarding her condition or for health maintenance advice. Please see specific information pulled into the AVS if appropriate.     MYKE Joy, ACNP-BC  Pulmonology, Critical Care, and Sleep Medicine

## 2024-06-21 ENCOUNTER — OFFICE VISIT (OUTPATIENT)
Dept: SLEEP MEDICINE | Age: 24
End: 2024-06-21
Payer: COMMERCIAL

## 2024-06-21 VITALS
WEIGHT: 146 LBS | OXYGEN SATURATION: 99 % | SYSTOLIC BLOOD PRESSURE: 110 MMHG | BODY MASS INDEX: 24.92 KG/M2 | HEART RATE: 73 BPM | DIASTOLIC BLOOD PRESSURE: 80 MMHG | TEMPERATURE: 97.1 F | HEIGHT: 64 IN

## 2024-06-21 DIAGNOSIS — E66.3 OVERWEIGHT WITH BODY MASS INDEX (BMI) 25.0-29.9: ICD-10-CM

## 2024-06-21 DIAGNOSIS — F51.04 PSYCHOPHYSIOLOGICAL INSOMNIA: Primary | ICD-10-CM

## 2024-06-21 RX ORDER — CETIRIZINE HYDROCHLORIDE 10 MG/1
10 TABLET ORAL DAILY
COMMUNITY

## 2024-09-23 ENCOUNTER — OFFICE VISIT (OUTPATIENT)
Dept: SLEEP MEDICINE | Age: 24
End: 2024-09-23
Payer: COMMERCIAL

## 2024-09-23 VITALS
TEMPERATURE: 97.3 F | DIASTOLIC BLOOD PRESSURE: 68 MMHG | HEART RATE: 96 BPM | HEIGHT: 64 IN | BODY MASS INDEX: 24.87 KG/M2 | WEIGHT: 145.7 LBS | OXYGEN SATURATION: 99 % | SYSTOLIC BLOOD PRESSURE: 132 MMHG

## 2024-09-23 DIAGNOSIS — F51.04 PSYCHOPHYSIOLOGICAL INSOMNIA: Primary | ICD-10-CM

## 2024-09-23 DIAGNOSIS — G47.22 CIRCADIAN RHYTHM SLEEP DISORDER, ADVANCED SLEEP PHASE TYPE: ICD-10-CM

## 2024-09-23 DIAGNOSIS — E66.3 OVERWEIGHT WITH BODY MASS INDEX (BMI) 25.0-29.9: ICD-10-CM

## 2024-09-23 PROCEDURE — 99213 OFFICE O/P EST LOW 20 MIN: CPT | Performed by: NURSE PRACTITIONER

## 2025-04-14 ENCOUNTER — TRANSCRIBE ORDERS (OUTPATIENT)
Dept: ADMINISTRATIVE | Facility: HOSPITAL | Age: 25
End: 2025-04-14
Payer: COMMERCIAL

## 2025-04-14 DIAGNOSIS — N63.14 MASS OF LOWER INNER QUADRANT OF RIGHT BREAST: Primary | ICD-10-CM

## 2025-04-16 ENCOUNTER — TRANSCRIBE ORDERS (OUTPATIENT)
Dept: ADMINISTRATIVE | Facility: HOSPITAL | Age: 25
End: 2025-04-16
Payer: COMMERCIAL

## 2025-04-16 DIAGNOSIS — N63.14 MASS OF LOWER INNER QUADRANT OF RIGHT BREAST: Primary | ICD-10-CM

## 2025-04-22 ENCOUNTER — HOSPITAL ENCOUNTER (OUTPATIENT)
Facility: HOSPITAL | Age: 25
Discharge: HOME | End: 2025-04-22
Payer: COMMERCIAL

## 2025-04-22 DIAGNOSIS — N63.14 MASS OF LOWER INNER QUADRANT OF RIGHT BREAST: ICD-10-CM

## 2025-04-22 PROCEDURE — 76642 ULTRASOUND BREAST LIMITED: CPT | Performed by: RADIOLOGY

## 2025-04-22 PROCEDURE — 76642 ULTRASOUND BREAST LIMITED: CPT

## 2025-06-23 ENCOUNTER — TRANSCRIBE ORDERS (OUTPATIENT)
Dept: LAB | Facility: HOSPITAL | Age: 25
End: 2025-06-23
Payer: COMMERCIAL

## 2025-06-23 ENCOUNTER — LAB (OUTPATIENT)
Dept: LAB | Facility: HOSPITAL | Age: 25
End: 2025-06-23
Payer: COMMERCIAL

## 2025-06-23 DIAGNOSIS — N92.6 IRREGULAR MENSTRUAL CYCLE: ICD-10-CM

## 2025-06-23 DIAGNOSIS — Z78.9 PERSON LIVING IN RESIDENTIAL INSTITUTION: ICD-10-CM

## 2025-06-23 DIAGNOSIS — Z78.9 PERSON LIVING IN RESIDENTIAL INSTITUTION: Primary | ICD-10-CM

## 2025-06-23 LAB
HCG INTACT+B SERPL-ACNC: 321 MIU/ML
PROGEST SERPL-MCNC: 28.5 NG/ML

## 2025-06-23 PROCEDURE — 36415 COLL VENOUS BLD VENIPUNCTURE: CPT

## 2025-06-23 PROCEDURE — 84702 CHORIONIC GONADOTROPIN TEST: CPT

## 2025-06-23 PROCEDURE — 84144 ASSAY OF PROGESTERONE: CPT

## 2025-06-25 ENCOUNTER — LAB (OUTPATIENT)
Facility: HOSPITAL | Age: 25
End: 2025-06-25
Payer: COMMERCIAL

## 2025-06-25 ENCOUNTER — TRANSCRIBE ORDERS (OUTPATIENT)
Facility: HOSPITAL | Age: 25
End: 2025-06-25
Payer: COMMERCIAL

## 2025-06-25 DIAGNOSIS — N92.6 IRREGULAR MENSTRUAL CYCLE: ICD-10-CM

## 2025-06-25 DIAGNOSIS — Z78.9 PERSON LIVING IN RESIDENTIAL INSTITUTION: Primary | ICD-10-CM

## 2025-06-25 DIAGNOSIS — Z78.9 PERSON LIVING IN RESIDENTIAL INSTITUTION: ICD-10-CM

## 2025-06-25 LAB — HCG INTACT+B SERPL-ACNC: 709 MIU/ML

## 2025-06-25 PROCEDURE — 36415 COLL VENOUS BLD VENIPUNCTURE: CPT

## 2025-06-25 PROCEDURE — 84702 CHORIONIC GONADOTROPIN TEST: CPT

## 2025-08-18 ENCOUNTER — TRANSCRIBE ORDERS (OUTPATIENT)
Dept: LAB | Facility: HOSPITAL | Age: 25
End: 2025-08-18
Payer: COMMERCIAL

## 2025-08-18 ENCOUNTER — LAB (OUTPATIENT)
Dept: LAB | Facility: HOSPITAL | Age: 25
End: 2025-08-18
Payer: COMMERCIAL

## 2025-08-18 DIAGNOSIS — Z34.81 PRENATAL CARE, SUBSEQUENT PREGNANCY, FIRST TRIMESTER: ICD-10-CM

## 2025-08-18 DIAGNOSIS — Z34.81 PRENATAL CARE, SUBSEQUENT PREGNANCY, FIRST TRIMESTER: Primary | ICD-10-CM

## 2025-08-18 LAB
ABO GROUP BLD: NORMAL
AMPHET+METHAMPHET UR QL: NEGATIVE
AMPHETAMINES UR QL: NEGATIVE
BARBITURATES UR QL SCN: NEGATIVE
BENZODIAZ UR QL SCN: NEGATIVE
BLD GP AB SCN SERPL QL: NEGATIVE
BUPRENORPHINE SERPL-MCNC: NEGATIVE NG/ML
CANNABINOIDS SERPL QL: NEGATIVE
COCAINE UR QL: NEGATIVE
DEPRECATED RDW RBC AUTO: 42.1 FL (ref 37–54)
ERYTHROCYTE [DISTWIDTH] IN BLOOD BY AUTOMATED COUNT: 13.2 % (ref 12.3–15.4)
FENTANYL UR-MCNC: NEGATIVE NG/ML
HBV SURFACE AG SERPL QL IA: NORMAL
HCT VFR BLD AUTO: 40.2 % (ref 34–46.6)
HCV AB SER QL: NORMAL
HGB BLD-MCNC: 13.4 G/DL (ref 12–15.9)
HIV 1+2 AB+HIV1 P24 AG SERPL QL IA: NORMAL
MCH RBC QN AUTO: 29.3 PG (ref 26.6–33)
MCHC RBC AUTO-ENTMCNC: 33.3 G/DL (ref 31.5–35.7)
MCV RBC AUTO: 87.8 FL (ref 79–97)
METHADONE UR QL SCN: NEGATIVE
OPIATES UR QL: NEGATIVE
OXYCODONE UR QL SCN: NEGATIVE
PCP UR QL SCN: NEGATIVE
PLATELET # BLD AUTO: 208 10*3/MM3 (ref 140–450)
PMV BLD AUTO: 10.2 FL (ref 6–12)
RBC # BLD AUTO: 4.58 10*6/MM3 (ref 3.77–5.28)
RH BLD: POSITIVE
TREPONEMA PALLIDUM IGG+IGM AB [PRESENCE] IN SERUM OR PLASMA BY IMMUNOASSAY: NORMAL
TRICYCLICS UR QL SCN: NEGATIVE
WBC NRBC COR # BLD AUTO: 9.47 10*3/MM3 (ref 3.4–10.8)

## 2025-08-18 PROCEDURE — 86850 RBC ANTIBODY SCREEN: CPT

## 2025-08-18 PROCEDURE — 86901 BLOOD TYPING SEROLOGIC RH(D): CPT

## 2025-08-18 PROCEDURE — G0432 EIA HIV-1/HIV-2 SCREEN: HCPCS

## 2025-08-18 PROCEDURE — 87661 TRICHOMONAS VAGINALIS AMPLIF: CPT

## 2025-08-18 PROCEDURE — 87491 CHLMYD TRACH DNA AMP PROBE: CPT

## 2025-08-18 PROCEDURE — 86803 HEPATITIS C AB TEST: CPT

## 2025-08-18 PROCEDURE — 86762 RUBELLA ANTIBODY: CPT

## 2025-08-18 PROCEDURE — 36415 COLL VENOUS BLD VENIPUNCTURE: CPT

## 2025-08-18 PROCEDURE — 87591 N.GONORRHOEAE DNA AMP PROB: CPT

## 2025-08-18 PROCEDURE — 85027 COMPLETE CBC AUTOMATED: CPT

## 2025-08-18 PROCEDURE — 86780 TREPONEMA PALLIDUM: CPT

## 2025-08-18 PROCEDURE — 87340 HEPATITIS B SURFACE AG IA: CPT

## 2025-08-18 PROCEDURE — 86900 BLOOD TYPING SEROLOGIC ABO: CPT

## 2025-08-18 PROCEDURE — 80307 DRUG TEST PRSMV CHEM ANLYZR: CPT

## 2025-08-18 PROCEDURE — 87086 URINE CULTURE/COLONY COUNT: CPT

## 2025-08-19 LAB
C TRACH RRNA SPEC QL NAA+PROBE: NEGATIVE
N GONORRHOEA RRNA SPEC QL NAA+PROBE: NEGATIVE
RUBV IGG SERPL IA-ACNC: 10.4 INDEX
T VAGINALIS RRNA SPEC QL NAA+PROBE: NEGATIVE

## 2025-08-20 LAB — BACTERIA SPEC AEROBE CULT: NO GROWTH
